# Patient Record
Sex: MALE | Race: AMERICAN INDIAN OR ALASKA NATIVE | NOT HISPANIC OR LATINO | Employment: FULL TIME | URBAN - METROPOLITAN AREA
[De-identification: names, ages, dates, MRNs, and addresses within clinical notes are randomized per-mention and may not be internally consistent; named-entity substitution may affect disease eponyms.]

---

## 2024-02-21 ENCOUNTER — APPOINTMENT (OUTPATIENT)
Dept: GENERAL RADIOLOGY | Facility: CLINIC | Age: 34
End: 2024-02-21
Attending: EMERGENCY MEDICINE
Payer: COMMERCIAL

## 2024-02-21 ENCOUNTER — HOSPITAL ENCOUNTER (OUTPATIENT)
Facility: CLINIC | Age: 34
Setting detail: OBSERVATION
Discharge: HOME | End: 2024-02-22
Attending: EMERGENCY MEDICINE | Admitting: HOSPITALIST
Payer: COMMERCIAL

## 2024-02-21 ENCOUNTER — APPOINTMENT (OUTPATIENT)
Dept: CT IMAGING | Facility: CLINIC | Age: 34
End: 2024-02-21
Attending: EMERGENCY MEDICINE
Payer: COMMERCIAL

## 2024-02-21 DIAGNOSIS — D68.59 PROTEIN C DEFICIENCY (H): ICD-10-CM

## 2024-02-21 DIAGNOSIS — K92.2 LOWER GI BLEED: Primary | ICD-10-CM

## 2024-02-21 DIAGNOSIS — K92.2 UPPER GI BLEED: ICD-10-CM

## 2024-02-21 DIAGNOSIS — K92.1 MELENA: ICD-10-CM

## 2024-02-21 LAB
ABO/RH(D): NORMAL
ALBUMIN SERPL BCG-MCNC: 4.7 G/DL (ref 3.5–5.2)
ALP SERPL-CCNC: 100 U/L (ref 40–150)
ALT SERPL W P-5'-P-CCNC: 30 U/L (ref 0–70)
ANION GAP SERPL CALCULATED.3IONS-SCNC: 10 MMOL/L (ref 7–15)
ANTIBODY SCREEN: NEGATIVE
AST SERPL W P-5'-P-CCNC: 22 U/L (ref 0–45)
BASOPHILS # BLD AUTO: 0.1 10E3/UL (ref 0–0.2)
BASOPHILS NFR BLD AUTO: 1 %
BILIRUB SERPL-MCNC: 0.3 MG/DL
BUN SERPL-MCNC: 12.9 MG/DL (ref 6–20)
CALCIUM SERPL-MCNC: 9.1 MG/DL (ref 8.6–10)
CHLORIDE SERPL-SCNC: 107 MMOL/L (ref 98–107)
CREAT SERPL-MCNC: 0.81 MG/DL (ref 0.67–1.17)
DEPRECATED HCO3 PLAS-SCNC: 25 MMOL/L (ref 22–29)
EGFRCR SERPLBLD CKD-EPI 2021: >90 ML/MIN/1.73M2
EOSINOPHIL # BLD AUTO: 0.2 10E3/UL (ref 0–0.7)
EOSINOPHIL NFR BLD AUTO: 2 %
ERYTHROCYTE [DISTWIDTH] IN BLOOD BY AUTOMATED COUNT: 12.4 % (ref 10–15)
ERYTHROCYTE [DISTWIDTH] IN BLOOD BY AUTOMATED COUNT: 12.5 % (ref 10–15)
GLUCOSE SERPL-MCNC: 97 MG/DL (ref 70–99)
HCT VFR BLD AUTO: 42.4 % (ref 40–53)
HCT VFR BLD AUTO: 42.8 % (ref 40–53)
HGB BLD-MCNC: 14 G/DL (ref 13.3–17.7)
HGB BLD-MCNC: 14.3 G/DL (ref 13.3–17.7)
HGB BLD-MCNC: 14.3 G/DL (ref 13.3–17.7)
HGB BLD-MCNC: 14.4 G/DL (ref 13.3–17.7)
HOLD SPECIMEN: NORMAL
HOLD SPECIMEN: NORMAL
IMM GRANULOCYTES # BLD: 0.1 10E3/UL
IMM GRANULOCYTES NFR BLD: 1 %
INR PPP: 1.39 (ref 0.85–1.15)
LYMPHOCYTES # BLD AUTO: 2.6 10E3/UL (ref 0.8–5.3)
LYMPHOCYTES NFR BLD AUTO: 31 %
MCH RBC QN AUTO: 31.7 PG (ref 26.5–33)
MCH RBC QN AUTO: 32.4 PG (ref 26.5–33)
MCHC RBC AUTO-ENTMCNC: 33.6 G/DL (ref 31.5–36.5)
MCHC RBC AUTO-ENTMCNC: 33.8 G/DL (ref 31.5–36.5)
MCV RBC AUTO: 94 FL (ref 78–100)
MCV RBC AUTO: 95 FL (ref 78–100)
MONOCYTES # BLD AUTO: 0.6 10E3/UL (ref 0–1.3)
MONOCYTES NFR BLD AUTO: 7 %
NEUTROPHILS # BLD AUTO: 5.1 10E3/UL (ref 1.6–8.3)
NEUTROPHILS NFR BLD AUTO: 58 %
NRBC # BLD AUTO: 0 10E3/UL
NRBC BLD AUTO-RTO: 0 /100
PLATELET # BLD AUTO: 252 10E3/UL (ref 150–450)
PLATELET # BLD AUTO: 265 10E3/UL (ref 150–450)
POTASSIUM SERPL-SCNC: 4.5 MMOL/L (ref 3.4–5.3)
PROT SERPL-MCNC: 7.9 G/DL (ref 6.4–8.3)
RBC # BLD AUTO: 4.47 10E6/UL (ref 4.4–5.9)
RBC # BLD AUTO: 4.54 10E6/UL (ref 4.4–5.9)
SODIUM SERPL-SCNC: 142 MMOL/L (ref 135–145)
SPECIMEN EXPIRATION DATE: NORMAL
WBC # BLD AUTO: 7.8 10E3/UL (ref 4–11)
WBC # BLD AUTO: 8.5 10E3/UL (ref 4–11)

## 2024-02-21 PROCEDURE — 250N000011 HC RX IP 250 OP 636: Performed by: EMERGENCY MEDICINE

## 2024-02-21 PROCEDURE — 85610 PROTHROMBIN TIME: CPT | Performed by: EMERGENCY MEDICINE

## 2024-02-21 PROCEDURE — G0378 HOSPITAL OBSERVATION PER HR: HCPCS

## 2024-02-21 PROCEDURE — 85018 HEMOGLOBIN: CPT | Performed by: PHYSICIAN ASSISTANT

## 2024-02-21 PROCEDURE — 85027 COMPLETE CBC AUTOMATED: CPT | Performed by: PHYSICIAN ASSISTANT

## 2024-02-21 PROCEDURE — 96365 THER/PROPH/DIAG IV INF INIT: CPT

## 2024-02-21 PROCEDURE — 86900 BLOOD TYPING SEROLOGIC ABO: CPT | Performed by: EMERGENCY MEDICINE

## 2024-02-21 PROCEDURE — 99223 1ST HOSP IP/OBS HIGH 75: CPT | Mod: AI | Performed by: PHYSICIAN ASSISTANT

## 2024-02-21 PROCEDURE — 36415 COLL VENOUS BLD VENIPUNCTURE: CPT | Performed by: PHYSICIAN ASSISTANT

## 2024-02-21 PROCEDURE — 96376 TX/PRO/DX INJ SAME DRUG ADON: CPT

## 2024-02-21 PROCEDURE — 36415 COLL VENOUS BLD VENIPUNCTURE: CPT | Performed by: EMERGENCY MEDICINE

## 2024-02-21 PROCEDURE — 258N000003 HC RX IP 258 OP 636: Performed by: PHYSICIAN ASSISTANT

## 2024-02-21 PROCEDURE — 85018 HEMOGLOBIN: CPT | Performed by: EMERGENCY MEDICINE

## 2024-02-21 PROCEDURE — 80053 COMPREHEN METABOLIC PANEL: CPT | Performed by: EMERGENCY MEDICINE

## 2024-02-21 PROCEDURE — 99285 EMERGENCY DEPT VISIT HI MDM: CPT

## 2024-02-21 PROCEDURE — 71046 X-RAY EXAM CHEST 2 VIEWS: CPT

## 2024-02-21 PROCEDURE — 74177 CT ABD & PELVIS W/CONTRAST: CPT

## 2024-02-21 PROCEDURE — 85025 COMPLETE CBC W/AUTO DIFF WBC: CPT | Performed by: EMERGENCY MEDICINE

## 2024-02-21 PROCEDURE — 250N000009 HC RX 250: Performed by: EMERGENCY MEDICINE

## 2024-02-21 PROCEDURE — 250N000011 HC RX IP 250 OP 636: Performed by: PHYSICIAN ASSISTANT

## 2024-02-21 PROCEDURE — 96366 THER/PROPH/DIAG IV INF ADDON: CPT

## 2024-02-21 RX ORDER — ACETAMINOPHEN 650 MG/1
650 SUPPOSITORY RECTAL EVERY 4 HOURS PRN
Status: DISCONTINUED | OUTPATIENT
Start: 2024-02-21 | End: 2024-02-22 | Stop reason: HOSPADM

## 2024-02-21 RX ORDER — POLYETHYLENE GLYCOL 3350 17 G/17G
17 POWDER, FOR SOLUTION ORAL 2 TIMES DAILY PRN
Status: DISCONTINUED | OUTPATIENT
Start: 2024-02-21 | End: 2024-02-22 | Stop reason: HOSPADM

## 2024-02-21 RX ORDER — ACETAMINOPHEN 325 MG/1
650 TABLET ORAL EVERY 4 HOURS PRN
Status: DISCONTINUED | OUTPATIENT
Start: 2024-02-21 | End: 2024-02-22 | Stop reason: HOSPADM

## 2024-02-21 RX ORDER — ONDANSETRON 4 MG/1
4 TABLET, ORALLY DISINTEGRATING ORAL EVERY 6 HOURS PRN
Status: DISCONTINUED | OUTPATIENT
Start: 2024-02-21 | End: 2024-02-22 | Stop reason: HOSPADM

## 2024-02-21 RX ORDER — CYCLOBENZAPRINE HCL 10 MG
TABLET ORAL
COMMUNITY
Start: 2024-01-09

## 2024-02-21 RX ORDER — LIDOCAINE 40 MG/G
CREAM TOPICAL
Status: DISCONTINUED | OUTPATIENT
Start: 2024-02-21 | End: 2024-02-22 | Stop reason: HOSPADM

## 2024-02-21 RX ORDER — AMOXICILLIN 250 MG
2 CAPSULE ORAL 2 TIMES DAILY PRN
Status: DISCONTINUED | OUTPATIENT
Start: 2024-02-21 | End: 2024-02-22 | Stop reason: HOSPADM

## 2024-02-21 RX ORDER — HEPARIN SODIUM 10000 [USP'U]/100ML
0-5000 INJECTION, SOLUTION INTRAVENOUS CONTINUOUS
Status: DISCONTINUED | OUTPATIENT
Start: 2024-02-21 | End: 2024-02-22

## 2024-02-21 RX ORDER — AMOXICILLIN 250 MG
1 CAPSULE ORAL 2 TIMES DAILY PRN
Status: DISCONTINUED | OUTPATIENT
Start: 2024-02-21 | End: 2024-02-22 | Stop reason: HOSPADM

## 2024-02-21 RX ORDER — ONDANSETRON 2 MG/ML
4 INJECTION INTRAMUSCULAR; INTRAVENOUS EVERY 6 HOURS PRN
Status: DISCONTINUED | OUTPATIENT
Start: 2024-02-21 | End: 2024-02-22 | Stop reason: HOSPADM

## 2024-02-21 RX ORDER — SODIUM CHLORIDE 9 MG/ML
INJECTION, SOLUTION INTRAVENOUS CONTINUOUS
Status: ACTIVE | OUTPATIENT
Start: 2024-02-21 | End: 2024-02-22

## 2024-02-21 RX ORDER — RIVAROXABAN 20 MG/1
TABLET, FILM COATED ORAL
COMMUNITY
Start: 2024-01-09

## 2024-02-21 RX ORDER — IOPAMIDOL 755 MG/ML
500 INJECTION, SOLUTION INTRAVASCULAR ONCE
Status: COMPLETED | OUTPATIENT
Start: 2024-02-21 | End: 2024-02-21

## 2024-02-21 RX ADMIN — IOPAMIDOL 100 ML: 755 INJECTION, SOLUTION INTRAVENOUS at 13:08

## 2024-02-21 RX ADMIN — SODIUM CHLORIDE 100 ML: 9 INJECTION, SOLUTION INTRAVENOUS at 13:08

## 2024-02-21 RX ADMIN — HEPARIN SODIUM 1800 UNITS/HR: 10000 INJECTION, SOLUTION INTRAVENOUS at 19:44

## 2024-02-21 RX ADMIN — SODIUM CHLORIDE: 9 INJECTION, SOLUTION INTRAVENOUS at 19:12

## 2024-02-21 ASSESSMENT — ACTIVITIES OF DAILY LIVING (ADL)
ADLS_ACUITY_SCORE: 35
ADLS_ACUITY_SCORE: 35
ADLS_ACUITY_SCORE: 18
ADLS_ACUITY_SCORE: 35
ADLS_ACUITY_SCORE: 18
ADLS_ACUITY_SCORE: 35

## 2024-02-21 ASSESSMENT — COLUMBIA-SUICIDE SEVERITY RATING SCALE - C-SSRS
1. IN THE PAST MONTH, HAVE YOU WISHED YOU WERE DEAD OR WISHED YOU COULD GO TO SLEEP AND NOT WAKE UP?: NO
6. HAVE YOU EVER DONE ANYTHING, STARTED TO DO ANYTHING, OR PREPARED TO DO ANYTHING TO END YOUR LIFE?: NO
2. HAVE YOU ACTUALLY HAD ANY THOUGHTS OF KILLING YOURSELF IN THE PAST MONTH?: NO

## 2024-02-21 NOTE — ED TRIAGE NOTES
"Pt c/o bright red blood in stool that started this AM, reports 3 episodes associated with abdominal pain, denies N/V. 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 Reports fatigue the last couple days. A&OX4.      Triage Assessment (Adult)       Row Name 02/21/24 1036          Triage Assessment    Airway WDL WDL        Respiratory WDL    Respiratory WDL WDL        Skin Circulation/Temperature WDL    Skin Circulation/Temperature WDL WDL        Cardiac WDL    Cardiac WDL WDL        Peripheral/Neurovascular WDL    Peripheral Neurovascular WDL WDL        Cognitive/Neuro/Behavioral WDL    Cognitive/Neuro/Behavioral WDL WDL                     "

## 2024-02-21 NOTE — ED PROVIDER NOTES
"    History     Chief Complaint:  Rectal Bleeding       HPI   Rk Benoit is a 33 year old male who presents with rectal bleeding. The patient has had three bowel movements with bright red blood. He has had some chest pain since 1000. He denies shortness of breath. He has some abdominal cramping, and has felt fatigued over the past couple days. He denies alcohol use.     Independent Historian:    Patient    Review of External Notes:        Medications:    Xarelto     Past Medical History:    Protein C deficiency    Physical Exam   Patient Vitals for the past 24 hrs:   BP Temp Temp src Pulse Resp SpO2 Height Weight   02/21/24 1530 (!) 143/91 -- -- 92 12 99 % -- --   02/21/24 1248 127/81 -- -- 75 16 100 % -- --   02/21/24 1034 (!) 144/98 98.5  F (36.9  C) Oral 66 18 99 % -- --   02/21/24 1031 -- -- -- -- -- -- 1.854 m (6' 1\") 145 kg (319 lb 10.7 oz)        Physical Exam  General: Well-nourished, resting comfortably when I enter the room  Eyes: Pupils equal, conjunctivae pink no scleral icterus or conjunctival injection  ENT:  Moist mucus membranes  Respiratory:  Lungs clear to auscultation bilaterally, no crackles/rubs/wheezes.  Good air movement  CV: Normal rate and rhythm, no murmurs  GI:  Abdomen soft and non-distended.  No guarding or rebound.  Mild tenderness to palpation of the diffuse abdomen.  Skin: Warm, dry.  No rashes or petechiae  Musculoskeletal: No peripheral edema or calf tenderness  Neuro: Alert and oriented to person/place/time  Psychiatric: Normal affect      Emergency Department Course         Imaging:  Chest XR,  PA & LAT   Final Result   IMPRESSION: Negative chest.      MIKO RILEY MD            SYSTEM ID:  [id number]      CT Abdomen Pelvis w Contrast   Final Result   IMPRESSION:    1.  No acute abnormality identified.   2.  Fatty liver.      RIGO MARTINES MD            SYSTEM ID:  [id number]        Report per radiology    Laboratory:  Labs Ordered and Resulted from Time of ED Arrival to Time " of ED Departure   INR - Abnormal       Result Value    INR 1.39 (*)    COMPREHENSIVE METABOLIC PANEL - Normal    Sodium 142      Potassium 4.5      Carbon Dioxide (CO2) 25      Anion Gap 10      Urea Nitrogen 12.9      Creatinine 0.81      GFR Estimate >90      Calcium 9.1      Chloride 107      Glucose 97      Alkaline Phosphatase 100      AST 22      ALT 30      Protein Total 7.9      Albumin 4.7      Bilirubin Total 0.3     HEMOGLOBIN - Normal    Hemoglobin 14.3     CBC WITH PLATELETS AND DIFFERENTIAL    WBC Count 8.5      RBC Count 4.54      Hemoglobin 14.4      Hematocrit 42.8      MCV 94      MCH 31.7      MCHC 33.6      RDW 12.4      Platelet Count 252      % Neutrophils 58      % Lymphocytes 31      % Monocytes 7      % Eosinophils 2      % Basophils 1      % Immature Granulocytes 1      NRBCs per 100 WBC 0      Absolute Neutrophils 5.1      Absolute Lymphocytes 2.6      Absolute Monocytes 0.6      Absolute Eosinophils 0.2      Absolute Basophils 0.1      Absolute Immature Granulocytes 0.1      Absolute NRBCs 0.0     TYPE AND SCREEN, ADULT    ABO/RH(D) A POS      Antibody Screen Negative      SPECIMEN EXPIRATION DATE [id number]     ABO/RH TYPE AND SCREEN        Procedures       Emergency Department Course & Assessments:    Interventions:  Medications   sodium chloride 0.9 % bag 100 mL for CT scan flush use (100 mLs As instructed $Given 2/21/24 1308)   iopamidol (ISOVUE-370) solution 500 mL (100 mLs Intravenous $Given 2/21/24 1308)        Assessments:  1225 I obtained history and examined patient.       Independent Interpretation (X-rays, CTs, rhythm strip):  None    Consultations/Discussion of Management or Tests:  Spoke with Lucia with hospitalist team, accepts patient for admission for observation to Dr. Ribeiro.       Social Determinants of Health affecting care:  None     Disposition:  The patient was admitted to the hospital under the care of Dr. Ribeiro.     Impression & Plan    CMS Diagnoses:  None      Medical Decision Makin-year-old male with a history of protein C deficiency on Xarelto presents to the emergency department with a complaint of bright red bowel movements.  Patient reports this started this morning.  He has had 3 bowel movements that are dark and bright red.  He has not had any nausea or vomiting.  He does report abdominal cramping diffusely.  He states that he has had some intermittent chest pain since 10 AM this morning.  Denies any shortness of breath.  On exam patient's abdomen is soft, he has some mild tenderness to palpation.  Patient does provide a stool sample that is dark in color and looks very bloody.  Patient is not having any bleeding between bowel movements.  Vital signs remained stable, the patient is not hypotensive or tachycardic.  Repeat hemoglobin is stable.  CT scan does not show any acute findings.  His creatinine and BUN are not elevated, but his stool looks almost black, and I have suspicion for an upper GI bleed versus a lower GI bleed.  He has never had a colonoscopy before.  He reports that he is from out of town and staying in a hotel by himself.  As the patient has had 2 more bloody bowel movements here in the ED since his second recheck of hemoglobin I think it would be beneficial for him to stay here in for observation.  Patient is admitted for observation to Dr. Ribeiro.      Diagnosis:    ICD-10-CM    1. Upper GI bleed  K92.2       2. Protein C deficiency (H24)  D68.59       3. Melena  K92.1            Discharge Medications:  New Prescriptions    No medications on file          Scribe Disclosure:  Cristhian FORBES, am serving as a scribe at 12:25 PM on 2024 to document services personally performed by Evelia Garcia MD based on my observations and the provider's statements to me.  2024   Evelia Garcia MD Richardson, Elizabeth, MD  24

## 2024-02-21 NOTE — PHARMACY-ADMISSION MEDICATION HISTORY
Pharmacist Admission Medication History    Admission medication history is complete. The information provided in this note is only as accurate as the sources available at the time of the update.    Information Source(s): Patient and CareEverywhere/SureScripts via in-person    Pertinent Information: Patient reports an allergy to an antibiotic but does not know which one. He reports that when he took it he experienced hot flashes, chills, nausea, and vomiting. There is no outside chart access to review, however since these are not true allergy symptoms, this has not been added to the chart.     Changes made to PTA medication list:  Added: None  Deleted: None  Changed: None    Allergies reviewed with patient and updates made in EHR: yes    Medication History Completed By:   Amira Teague PharmD, [facility]  Emergency Medicine Clinical Pharmacist  [phone]   2/21/2024 5:43 PM    PTA Med List   Medication Sig Last Dose    cyclobenzaprine (FLEXERIL) 10 MG tablet TAKE ONE TABLET BY MOUTH EVERY EVENING AT BEDTIME FOR BACK PAIN Past Month at -    XARELTO ANTICOAGULANT 20 MG TABS tablet TAKE ONE TABLET BY MOUTH ONCE DAILY WITH THE EVENING MEAL 2/20/2024 at PM

## 2024-02-22 VITALS
BODY MASS INDEX: 41.27 KG/M2 | DIASTOLIC BLOOD PRESSURE: 81 MMHG | WEIGHT: 311.4 LBS | HEART RATE: 81 BPM | OXYGEN SATURATION: 98 % | HEIGHT: 73 IN | RESPIRATION RATE: 16 BRPM | TEMPERATURE: 98.2 F | SYSTOLIC BLOOD PRESSURE: 131 MMHG

## 2024-02-22 LAB
ANION GAP SERPL CALCULATED.3IONS-SCNC: 9 MMOL/L (ref 7–15)
APTT PPP: 65 SECONDS (ref 22–38)
APTT PPP: 73 SECONDS (ref 22–38)
APTT PPP: 74 SECONDS (ref 22–38)
BUN SERPL-MCNC: 11.4 MG/DL (ref 6–20)
CALCIUM SERPL-MCNC: 8.2 MG/DL (ref 8.6–10)
CHLORIDE SERPL-SCNC: 107 MMOL/L (ref 98–107)
CREAT SERPL-MCNC: 0.74 MG/DL (ref 0.67–1.17)
DEPRECATED HCO3 PLAS-SCNC: 25 MMOL/L (ref 22–29)
EGFRCR SERPLBLD CKD-EPI 2021: >90 ML/MIN/1.73M2
ERYTHROCYTE [DISTWIDTH] IN BLOOD BY AUTOMATED COUNT: 12.3 % (ref 10–15)
GLUCOSE SERPL-MCNC: 94 MG/DL (ref 70–99)
HCT VFR BLD AUTO: 41.9 % (ref 40–53)
HGB BLD-MCNC: 14 G/DL (ref 13.3–17.7)
HGB BLD-MCNC: 14.2 G/DL (ref 13.3–17.7)
MCH RBC QN AUTO: 31.8 PG (ref 26.5–33)
MCHC RBC AUTO-ENTMCNC: 33.9 G/DL (ref 31.5–36.5)
MCV RBC AUTO: 94 FL (ref 78–100)
PLATELET # BLD AUTO: 234 10E3/UL (ref 150–450)
POTASSIUM SERPL-SCNC: 3.8 MMOL/L (ref 3.4–5.3)
RBC # BLD AUTO: 4.46 10E6/UL (ref 4.4–5.9)
SODIUM SERPL-SCNC: 141 MMOL/L (ref 135–145)
UFH PPP CHRO-ACNC: 0.78 IU/ML
WBC # BLD AUTO: 8 10E3/UL (ref 4–11)

## 2024-02-22 PROCEDURE — 96366 THER/PROPH/DIAG IV INF ADDON: CPT

## 2024-02-22 PROCEDURE — 85730 THROMBOPLASTIN TIME PARTIAL: CPT | Performed by: PHYSICIAN ASSISTANT

## 2024-02-22 PROCEDURE — 36415 COLL VENOUS BLD VENIPUNCTURE: CPT | Performed by: PHYSICIAN ASSISTANT

## 2024-02-22 PROCEDURE — 85520 HEPARIN ASSAY: CPT | Performed by: PHYSICIAN ASSISTANT

## 2024-02-22 PROCEDURE — 85018 HEMOGLOBIN: CPT | Performed by: PHYSICIAN ASSISTANT

## 2024-02-22 PROCEDURE — 250N000013 HC RX MED GY IP 250 OP 250 PS 637: Performed by: PHYSICIAN ASSISTANT

## 2024-02-22 PROCEDURE — G0378 HOSPITAL OBSERVATION PER HR: HCPCS

## 2024-02-22 PROCEDURE — 80048 BASIC METABOLIC PNL TOTAL CA: CPT | Performed by: PHYSICIAN ASSISTANT

## 2024-02-22 PROCEDURE — 85730 THROMBOPLASTIN TIME PARTIAL: CPT | Mod: 91 | Performed by: PHYSICIAN ASSISTANT

## 2024-02-22 PROCEDURE — 99239 HOSP IP/OBS DSCHRG MGMT >30: CPT | Performed by: HOSPITALIST

## 2024-02-22 RX ADMIN — ACETAMINOPHEN 650 MG: 325 TABLET, FILM COATED ORAL at 08:17

## 2024-02-22 ASSESSMENT — ACTIVITIES OF DAILY LIVING (ADL)
ADLS_ACUITY_SCORE: 18

## 2024-02-22 NOTE — DISCHARGE SUMMARY
"[facility]  Hospitalist Discharge Summary      Date of Admission:  2/21/2024  Date of Discharge:  2/22/2024  Discharging Provider: Ede Ribeiro MD  Discharge Service: Hospitalist Service    Discharge Diagnoses   Bright red blood per rectum  Likely hemorrhoidal bleed    Clinically Significant Risk Factors     # Severe Obesity: Estimated body mass index is 41.08 kg/m  as calculated from the following:    Height as of this encounter: 1.854 m (6' 1\").    Weight as of this encounter: 141.3 kg (311 lb 6.4 oz).       Follow-ups Needed After Discharge   Follow-up Appointments     Follow-up and recommended labs and tests       Follow up with primary care provider, Physician No Ref-Primary, within 7   days for hospital follow- up.  The following labs/tests are recommended:   hemoglobin check.  Follow-up with colorectal surgery. You likely need a colonoscopy and then   discuss possible banding of any hemorrhoids.          Unresulted Labs Ordered in the Past 30 Days of this Admission       No orders found for last 31 day(s).        These results will be followed up by NA    Discharge Disposition   Discharged to home  Condition at discharge: Stable    Hospital Course   Rk Benoit is a 33 year old male with PMHx significant for protein C deficiency on Xarelto with hx of multiple DVT/PEs and notes chronic PE and DVT, who presents to the ED with BRBPR. He notes having a BM first thing this morning with blood when he wiped. He then went to work and had 2 episodes of just blood with associated preceding abdominal cramping. ED provider reports that he had another stool in ED that was a little darker. Pt notes melena several years ago and this was different. He notes feeling mildly lightheaded and unwell but otherwise denies fever, chest pain, SOB, nausea or vomiting. Denies changes to stools or excessive straining.   He is on Xarelto for Protein C deficiency. He notes a hx of multiple PE/DVTs with " "chronic clot in his lungs and his legs. He did not take Xarelto this morning and notes that his R leg already feels warm.    Patient was admitted yesterday and I assumed care today.  He has had no bleeding while here in the hospital.  His hemoglobin is stable.  He has known hemorrhoids.  He states that he has had some bleeding in the past.  He can feel a \"lump \"when he wipes.  He does not have issues with constipation.  He has never had a colonoscopy.  I indicated that because he is anticoagulated for his protein C deficiency, he might have issues with this again in the future.  I indicated neck step would be to have a colonoscopy.  I recommended that this be done with a colorectal surgeon.  This way, if he needs his hemorrhoids taken care of he is already seeing the right specialty.  Patient denies any new symptoms.  No chest pain or shortness of breath.  I am going to stop the heparin and resume his Xarelto.  Apparently he takes his Xarelto at different times of the day.  Sometimes in the morning and sometimes at night.  I indicated that it is a 24-hour drug and he should try to take it at about the same time each day.  I did call and update his wife.  Consultations This Hospital Stay   PHARMACY IP CONSULT    Code Status   Full Code    Time Spent on this Encounter   I, Ede Ribeiro MD, personally saw the patient today and spent greater than 30 minutes discharging this patient.       Ede Ribeiro MD  [facility] OBSERVATION DEPT  [address] [facility] [address]  Phone: [phone]  ______________________________________________________________________    Physical Exam   Vital Signs: Temp: 98.2  F (36.8  C) Temp src: Oral BP: 131/81 Pulse: 81   Resp: 16 SpO2: 98 % O2 Device: None (Room air)    Weight: 311 lbs 6.4 oz  Constitutional: awake, alert, cooperative, no apparent distress, and appears stated age  Eyes: Lids and lashes normal, pupils equal, round and reactive to light, " extra ocular muscles intact, sclera clear, conjunctiva normal  ENT: Normocephalic, without obvious abnormality, atraumatic, sinuses nontender on palpation, external ears without lesions, oral pharynx with moist mucous membranes, tonsils without erythema or exudates, gums normal and good dentition.       Primary Care Physician   Physician No Ref-Primary    Discharge Orders      Adult Colorectal Surgery  Referral      Reason for your hospital stay    Bright red blood per rectum  Likely a hemorrhoidal bleed     Follow-up and recommended labs and tests     Follow up with primary care provider, Physician No Ref-Primary, within 7 days for hospital follow- up.  The following labs/tests are recommended: hemoglobin check.  Follow-up with colorectal surgery. You likely need a colonoscopy and then discuss possible banding of any hemorrhoids.     Activity    Your activity upon discharge: activity as tolerated     Diet    Follow this diet upon discharge: Orders Placed This Encounter      Regular Diet Adult  Avoid constipation       Significant Results and Procedures   Most Recent 3 CBC's:  Recent Labs   Lab Test 02/22/24  0505 02/22/24  0101 02/21/24  1914 02/21/24  1554 02/21/24  1233   WBC 8.0  --   --  7.8 8.5   HGB 14.2 14.0 14.0 14.3  14.3 14.4   MCV 94  --   --  95 94     --   --  265 252     Most Recent 3 BMP's:  Recent Labs   Lab Test 02/22/24  0505 02/21/24  1056    142   POTASSIUM 3.8 4.5   CHLORIDE 107 107   CO2 25 25   BUN 11.4 12.9   CR 0.74 0.81   ANIONGAP 9 10   DESTINY 8.2* 9.1   GLC 94 97     Most Recent 2 LFT's:  Recent Labs   Lab Test 02/21/24  1056   AST 22   ALT 30   ALKPHOS 100   BILITOTAL 0.3   ,   Results for orders placed or performed during the hospital encounter of 02/21/24   Chest XR,  PA & LAT    Narrative    XR CHEST 2 VIEWS  2/21/2024 1:51 PM       INDICATION: chest pain  COMPARISON: None       Impression    IMPRESSION: Negative chest.    MIKO RILEY MD         SYSTEM ID:   [id number]   CT Abdomen Pelvis w Contrast    Narrative    CT ABDOMEN AND PELVIS WITH CONTRAST  2/21/2024 1:14 PM    CLINICAL HISTORY: Diffuse abdominal pain, rectal bleeding.    TECHNIQUE: CT scan of the abdomen and pelvis was performed following  injection of IV contrast. Multiplanar reformats were obtained. Dose  reduction techniques were used.  CONTRAST: 100 mL Isovue-370    COMPARISON: None.    FINDINGS:   LOWER CHEST: Normal.    HEPATOBILIARY: Fatty liver. Gallbladder appears unremarkable.    PANCREAS: Normal.    SPLEEN: Normal.    ADRENAL GLANDS: Normal.    KIDNEYS/BLADDER: Normal.    BOWEL: No bowel obstruction, or acute inflammatory change identified.  Please note that CT has limited sensitivity for detection of  underlying bowel masses. There is no perirectal or perianal abscess  identified at this time. Normal appendix.    PELVIC ORGANS: No acute abnormality.    ADDITIONAL FINDINGS: None.    MUSCULOSKELETAL: Normal.      Impression    IMPRESSION:   1.  No acute abnormality identified.  2.  Fatty liver.    RIGO MARTINES MD         SYSTEM ID:  [id number]       Discharge Medications   Current Discharge Medication List        CONTINUE these medications which have NOT CHANGED    Details   cyclobenzaprine (FLEXERIL) 10 MG tablet TAKE ONE TABLET BY MOUTH EVERY EVENING AT BEDTIME FOR BACK PAIN      XARELTO ANTICOAGULANT 20 MG TABS tablet TAKE ONE TABLET BY MOUTH ONCE DAILY WITH THE EVENING MEAL           Allergies   No Known Allergies

## 2024-02-22 NOTE — CARE PLAN
PRIMARY DIAGNOSIS: GI BLEED    OUTPATIENT/OBSERVATION GOALS TO BE MET BEFORE DISCHARGE  Orthostatic performed: N/A    Stable Hgb  yes .   Recent Labs   Lab Test 02/21/24  1914 02/21/24  1554 02/21/24  1233   HGB 14.0 14.3  14.3 14.4       Resolved or declined bleeding episodes: Yes Last episode: not yet in the shift    Appropriate testing complete: No    Cleared for discharge by consultants (if involved): No    Safe discharge environment identified: N/A    Discharge Planner Nurse   Safe discharge environment identified: No  Barriers to discharge: Yes       Entered by: Olvin Zapata RN 02/22/2024 12:44 AM  Vitals are Temp: 98  F (36.7  C) Temp src: Oral BP: (!) 143/91 Pulse: 87   Resp: 18 SpO2: 98 %.  Patient is Alert and Oriented x4. independent with no assistive devices .Pt is on a NPO diet. Patient denies of pain. Heparin running at 1800 unit/ hr. Pt is doing great in the room.  Please review provider order for any additional goals.   Nurse to notify provider when observation goals have been met and patient is ready for discharge.

## 2024-02-22 NOTE — ED NOTES
"[facility]  ED Nurse Handoff Report    ED Chief complaint: Rectal Bleeding  . ED Diagnosis:   Final diagnoses:   Upper GI bleed   Protein C deficiency (H24)   Melena       Allergies: No Known Allergies    Code Status: Full Code    Activity level - Baseline/Home:  independent.  Activity Level - Current:   independent.   Lift room needed: No.   Bariatric: No   Needed: No   Isolation: No.   Infection: Not Applicable.     Respiratory status: Room air    Vital Signs (within 30 minutes):   Vitals:    02/21/24 1034 02/21/24 1248 02/21/24 1530 02/21/24 1730   BP: (!) 144/98 127/81 (!) 143/91 128/67   Pulse: 66 75 92 65   Resp: 18 16 12 11   Temp: 98.5  F (36.9  C)      TempSrc: Oral      SpO2: 99% 100% 99% 100%   Weight:       Height:           Cardiac Rhythm:  ,      Pain level:    Patient confused: No.   Patient Falls Risk: nonskid shoes/slippers when out of bed.   Elimination Status: Has voided     Patient Report - Initial Complaint: Pt c/o bright red blood in stool that started this AM, reports 3 episodes associated with abdominal pain, denies N/V. 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 Reports fatigue the last couple days. A&OX4.  .   Focused Assessment: GI work up     Abnormal Results:   Labs Ordered and Resulted from Time of ED Arrival to Time of ED Departure   INR - Abnormal       Result Value    INR 1.39 (*)    COMPREHENSIVE METABOLIC PANEL - Normal    Sodium 142      Potassium 4.5      Carbon Dioxide (CO2) 25      Anion Gap 10      Urea Nitrogen 12.9      Creatinine 0.81      GFR Estimate >90      Calcium 9.1      Chloride 107      Glucose 97      Alkaline Phosphatase 100      AST 22      ALT 30      Protein Total 7.9      Albumin 4.7      Bilirubin Total 0.3     HEMOGLOBIN - Normal    Hemoglobin 14.3     CBC WITH PLATELETS AND DIFFERENTIAL    WBC Count 8.5      RBC Count 4.54      Hemoglobin 14.4      Hematocrit 42.8      " MCV 94      MCH 31.7      MCHC 33.6      RDW 12.4      Platelet Count 252      % Neutrophils 58      % Lymphocytes 31      % Monocytes 7      % Eosinophils 2      % Basophils 1      % Immature Granulocytes 1      NRBCs per 100 WBC 0      Absolute Neutrophils 5.1      Absolute Lymphocytes 2.6      Absolute Monocytes 0.6      Absolute Eosinophils 0.2      Absolute Basophils 0.1      Absolute Immature Granulocytes 0.1      Absolute NRBCs 0.0     TYPE AND SCREEN, ADULT    ABO/RH(D) A POS      Antibody Screen Negative      SPECIMEN EXPIRATION DATE [id number]     ABO/RH TYPE AND SCREEN        Chest XR,  PA & LAT   Final Result   IMPRESSION: Negative chest.      MIKO RILEY MD            SYSTEM ID:  [id number]      CT Abdomen Pelvis w Contrast   Final Result   IMPRESSION:    1.  No acute abnormality identified.   2.  Fatty liver.      RIGO MARTINES MD            SYSTEM ID:  [id number]          Treatments provided:   Labs Ordered and Resulted from Time of ED Arrival to Time of ED Departure   INR - Abnormal       Result Value    INR 1.39 (*)    COMPREHENSIVE METABOLIC PANEL - Normal    Sodium 142      Potassium 4.5      Carbon Dioxide (CO2) 25      Anion Gap 10      Urea Nitrogen 12.9      Creatinine 0.81      GFR Estimate >90      Calcium 9.1      Chloride 107      Glucose 97      Alkaline Phosphatase 100      AST 22      ALT 30      Protein Total 7.9      Albumin 4.7      Bilirubin Total 0.3     HEMOGLOBIN - Normal    Hemoglobin 14.3     CBC WITH PLATELETS AND DIFFERENTIAL    WBC Count 8.5      RBC Count 4.54      Hemoglobin 14.4      Hematocrit 42.8      MCV 94      MCH 31.7      MCHC 33.6      RDW 12.4      Platelet Count 252      % Neutrophils 58      % Lymphocytes 31      % Monocytes 7      % Eosinophils 2      % Basophils 1      % Immature Granulocytes 1      NRBCs per 100 WBC 0      Absolute Neutrophils 5.1      Absolute Lymphocytes 2.6      Absolute Monocytes 0.6      Absolute Eosinophils 0.2      Absolute  Basophils 0.1      Absolute Immature Granulocytes 0.1      Absolute NRBCs 0.0     TYPE AND SCREEN, ADULT    ABO/RH(D) A POS      Antibody Screen Negative      SPECIMEN EXPIRATION DATE [id number]     ABO/RH TYPE AND SCREEN     Chest XR,  PA & LAT   Final Result   IMPRESSION: Negative chest.      MIKO RILEY MD            SYSTEM ID:  [id number]      CT Abdomen Pelvis w Contrast   Final Result   IMPRESSION:    1.  No acute abnormality identified.   2.  Fatty liver.      RIGO MARTINES MD            SYSTEM ID:  [id number]          Family Comments: none  OBS brochure/video discussed/provided to patient:  Yes  ED Medications:   Medications   sodium chloride 0.9 % bag 100 mL for CT scan flush use (100 mLs As instructed $Given 2/21/24 1308)   iopamidol (ISOVUE-370) solution 500 mL (100 mLs Intravenous $Given 2/21/24 1308)       Drips infusing:  No  For the majority of the shift this patient was Green.   Interventions performed were none.    Sepsis treatment initiated: No    Cares/treatment/interventions/medications to be completed following ED care: care plan    ED Nurse Name: Brooke Gordillo RN  6:31 PM  RECEIVING UNIT ED HANDOFF REVIEW    Above ED Nurse Handoff Report was reviewed: Yes  Reviewed by: Olvin Zapata RN on February 21, 2024 at 8:24 PM   ANDREI Gutierrez called the ED to inform them the note was read: Yes

## 2024-02-22 NOTE — PLAN OF CARE
[facility]    ED Boarding Nurse Handoff Addendum Report:    Date/time: 2/21/2024, 8:30 PM    Activity Level: independent    Fall Risk: N/A    Active Infusions: PIX x2,  ml/hr, Hep gtt 1800 units/hr     Current Meds Due: See Mar     Current care needs: See Orders     Oxygen requirements (liters/min and/or FiO2): none    Respiratory status: Room air    Vital signs (within last 30 minutes):    Vitals:    02/21/24 1830 02/21/24 1909 02/21/24 1915 02/21/24 1930   BP: 133/77 122/70 122/70 129/72   Pulse: 102  67 69   Resp: 25  12 15   Temp:  97.4  F (36.3  C)     TempSrc:  Oral     SpO2: 97%  100% 100%   Weight:       Height:           Focused assessment within last 30 minutes:      ED Boarding Nurse name: Barb Oglesby RN

## 2024-02-22 NOTE — PLAN OF CARE
Patient's After Visit Summary was reviewed with patient   Patient verbalized understanding of After Visit Summary, recommended follow up and was given an opportunity to ask questions. Yes  Discharge medications sent home with patient/family: Not applicable   Discharged: Drove himself home  Goal Outcome Evaluation:

## 2024-02-22 NOTE — CARE PLAN
PRIMARY DIAGNOSIS: GI BLEED    OUTPATIENT/OBSERVATION GOALS TO BE MET BEFORE DISCHARGE  Orthostatic performed: N/A    Stable Hgb  yes .   Recent Labs   Lab Test 02/21/24  1914 02/21/24  1554 02/21/24  1233   HGB 14.0 14.3  14.3 14.4       Resolved or declined bleeding episodes: Yes Last episode: not yet in the shift    Appropriate testing complete: No    Cleared for discharge by consultants (if involved): No    Safe discharge environment identified: N/A    Discharge Planner Nurse   Safe discharge environment identified: No  Barriers to discharge: Yes       Entered by: Olvin Zapata RN 02/22/2024 4:31 AM  Vitals are Temp: 98.3  F (36.8  C) Temp src: Oral BP: 114/67 Pulse: 58   Resp: 16 SpO2: 96 %.  Patient is Alert and Oriented x4. independent with no assistive devices .Pt is on a NPO diet. Patient denies of pain. PIX  x 2,  ml/hr. Hep gtt 1800 unit/hr.Lab drawn @ 2 am, hep dose unchanged.    Plan: Recheck heparin @ 8;30 pm  Continue care plan  Please review provider order for any additional goals.   Nurse to notify provider when observation goals have been met and patient is ready for discharge.

## 2024-02-22 NOTE — H&P
[facility]    History and Physical - Hospitalist Service       Date of Admission:  2/21/2024    Assessment & Plan      Rk Benoit is a 33 year old male with PMHx significant for protein C deficiency on Xarelto with hx of multiple DVT/PEs and notes chronic PE and DVT, who was admitted on 2/21/2024 with lower GI bleed.     1. Lower GI bleed  Onset of BRBPR this morning. Notes BM first thing this morning with blood when he wiped, then 2 episodes of just blood while at work with associated preceding abdominal cramping. Per ED provider, stool in ED was a little darker. Pt notes melena several years ago and this was different. He notes feeling mildly lightheaded and unwell but otherwise denies fever, chest pain, SOB, nausea or vomiting.   He is on Xarelto for Protein C deficiency. Notes hx of multiple PE/DVTs with chronic clot as well. Did not take Xarelto this morning.   VSS. CMP unremarkable. CBC unremarkable, Hgb 14.4-->14.3 after 3.5 hrs.CT abd/pelvis unremarkable. CXR clear.   Pt notes concern for worsening clot in LE even after missing just one dose. Report R calf is already starting to feel warm.   - admit to OBS  - suspect diverticular bleed vs internal hemorrhoids. Has not had a colonoscopy  - hold Xarelto  - given Protein C deficiency and chronic clot and report of calf feeling warm and given Hgb is stable,   - will start heparin gtt for now  - recheck Hgb q 4 hrs x2. Recheck CBC in AM  - NPO for bowel rest to help bleeding stop  - if hgb drops or ongoing profuse bleeding, stop heparin gtt until morning rounder can reassess  - if bleeding persists, consider GI consult. May need procedure if unable to come off anticoagulation, even for a short time    2. Protein C deficiency  Chronic anticoagulation  Hx of multiple DVT/PEs, some chronic clot burden per pt  Pt concerned about holding anticoagulation, given chronic clot burden, concerned with recurrent DVT/PE with holding anticoagulation  "for any amount of time. Already notes warmth in his R LE  - hold Xarelto   - start heparin gtt         Diet:  NPO  DVT Prophylaxis: Heparin gtt   Adame Catheter: Not present  Lines: None     Cardiac Monitoring: None  Code Status:  full code    Clinically Significant Risk Factors Present on Admission               # Drug Induced Coagulation Defect: home medication list includes an anticoagulant medication         # Severe Obesity: Estimated body mass index is 42.17 kg/m  as calculated from the following:    Height as of this encounter: 1.854 m (6' 1\").    Weight as of this encounter: 145 kg (319 lb 10.7 oz).              Disposition Plan      Expected Discharge Date: 02/22/2024                The patient's care was discussed with the Patient and ED provider, Dr. Garcia .    Lucia Ken PA-C  Hospitalist Service  [facility]  Securely message with [employer] (more info)  Text page via [facility] Paging/Directory     ______________________________________________________________________    Chief Complaint   BRBPR    History is obtained from the patient    History of Present Illness   Rk Benoit is a 33 year old male with PMHx significant for protein C deficiency on Xarelto with hx of multiple DVT/PEs and notes chronic PE and DVT, who presents to the ED with BRBPR. He notes having a BM first thing this morning with blood when he wiped. He then went to work and had 2 episodes of just blood with associated preceding abdominal cramping. ED provider reports that he had another stool in ED that was a little darker. Pt notes melena several years ago and this was different. He notes feeling mildly lightheaded and unwell but otherwise denies fever, chest pain, SOB, nausea or vomiting. Denies changes to stools or excessive straining.   He is on Xarelto for Protein C deficiency. He notes a hx of multiple PE/DVTs with chronic clot in his lungs and his legs. He did not take Xarelto this morning and " notes that his R leg already feels warm.      Past Medical History    Protein C deficiency     Past Surgical History       Prior to Admission Medications   Prior to Admission Medications   Prescriptions Last Dose Informant Patient Reported? Taking?   XARELTO ANTICOAGULANT 20 MG TABS tablet 2/20/2024 at PM  Yes Yes   Sig: TAKE ONE TABLET BY MOUTH ONCE DAILY WITH THE EVENING MEAL   cyclobenzaprine (FLEXERIL) 10 MG tablet Past Month at -  Yes Yes   Sig: TAKE ONE TABLET BY MOUTH EVERY EVENING AT BEDTIME FOR BACK PAIN      Facility-Administered Medications: None           Physical Exam   Vital Signs: Temp: 98.5  F (36.9  C) Temp src: Oral BP: (!) 143/91 Pulse: 92   Resp: 12 SpO2: 99 % O2 Device: None (Room air)    Weight: 319 lbs 10.67 oz    GENERAL:  Comfortable.  PSYCH: pleasant, oriented, No acute distress.  HEENT:  Atraumatic, normocephalic. Normal conjunctiva, normal hearing, and oropharynx is normal.  NECK:  Supple, no neck vein distention  HEART:  Normal S1, S2 with no murmur, no pericardial rub, gallops or S3 or S4.  LUNGS:  Clear to auscultation, normal Respiratory effort. No wheezing, rales or ronchi.  GI:  Soft, normal bowel sounds. Non-tender, non distended.   EXTREMITIES:  No pedal edema, +2 pulses bilateral and equal.  SKIN:  Dry to touch, No rash, wound or ulcerations.  NEUROLOGIC:  CN 2-12 intact, BL 5/5 symmetric upper and lower extremity strength, sensation is intact with no focal deficits.      Medical Decision Making       75+ MINUTES SPENT BY ME on the date of service doing chart review, history, exam, documentation & further activities per the note.      Data     I have personally reviewed the following data over the past 24 hrs:    7.8  \   14.3; 14.3   / 265     142 107 12.9 /  97   4.5 25 0.81 \     ALT: 30 AST: 22 AP: 100 TBILI: 0.3   ALB: 4.7 TOT PROTEIN: 7.9 LIPASE: N/A     INR:  1.39 (H) PTT:  N/A   D-dimer:  N/A Fibrinogen:  N/A       Imaging results reviewed over the past 24 hrs:   Recent  Results (from the past 24 hour(s))   CT Abdomen Pelvis w Contrast    Narrative    CT ABDOMEN AND PELVIS WITH CONTRAST  2/21/2024 1:14 PM    CLINICAL HISTORY: Diffuse abdominal pain, rectal bleeding.    TECHNIQUE: CT scan of the abdomen and pelvis was performed following  injection of IV contrast. Multiplanar reformats were obtained. Dose  reduction techniques were used.  CONTRAST: 100 mL Isovue-[id number]    COMPARISON: None.    FINDINGS:   LOWER CHEST: Normal.    HEPATOBILIARY: Fatty liver. Gallbladder appears unremarkable.    PANCREAS: Normal.    SPLEEN: Normal.    ADRENAL GLANDS: Normal.    KIDNEYS/BLADDER: Normal.    BOWEL: No bowel obstruction, or acute inflammatory change identified.  Please note that CT has limited sensitivity for detection of  underlying bowel masses. There is no perirectal or perianal abscess  identified at this time. Normal appendix.    PELVIC ORGANS: No acute abnormality.    ADDITIONAL FINDINGS: None.    MUSCULOSKELETAL: Normal.      Impression    IMPRESSION:   1.  No acute abnormality identified.  2.  Fatty liver.    RIGO MARTINES MD         SYSTEM ID:  [id number]   Chest XR,  PA & LAT    Narrative    XR CHEST 2 VIEWS  2/21/2024 1:51 PM       INDICATION: chest pain  COMPARISON: None       Impression    IMPRESSION: Negative chest.    MIKO RILEY MD         SYSTEM ID:  [id number]

## 2024-02-22 NOTE — CARE PLAN
PRIMARY DIAGNOSIS: GI BLEED    OUTPATIENT/OBSERVATION GOALS TO BE MET BEFORE DISCHARGE  Orthostatic performed: N/A    Stable Hgb  yes .   Recent Labs   Lab Test 02/21/24  1914 02/21/24  1554 02/21/24  1233   HGB 14.0 14.3  14.3 14.4       Resolved or declined bleeding episodes: Yes Last episode: not yet in the shift    Appropriate testing complete: No    Cleared for discharge by consultants (if involved): Yes    Safe discharge environment identified: Yes    Discharge Planner Nurse   Safe discharge environment identified: Yes  Barriers to discharge: No       Entered by: Bess Moscoso RN 02/22/2024 10:15 AM  Vitals are Temp: 98.2  F (36.8  C) Temp src: Oral BP: 131/81 Pulse: 81   Resp: 16 SpO2: 98 %.    Patient is Aox4, VSS, LCTA, BS active. He denies pain this morning. Still has some blood in his stool. Has a colorectal outpatient consult referral placed. 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 Discharging home today

## 2024-02-23 NOTE — TELEPHONE ENCOUNTER
Diagnosis, Referred by & from: Bleeding Hemorrhoids   Appt date: 4/29/2024   NOTES STATUS DETAILS   OFFICE NOTE from referring provider N/A    OFFICE NOTE from other specialist N/A    DISCHARGE SUMMARY from [facility]:  2/21/24 - Admission with Dr. Ribeiro   DISCHARGE REPORT from the ER N/A    OPERATIVE REPORT N/A    MEDICATION LIST Internal    LABS N/A    DIAGNOSTIC PROCEDURES N/A    IMAGING (DISC & REPORT) N/A

## 2024-04-29 ENCOUNTER — PRE VISIT (OUTPATIENT)
Dept: SURGERY | Facility: CLINIC | Age: 34
End: 2024-04-29

## 2024-05-03 NOTE — TELEPHONE ENCOUNTER
Diagnosis, Referred by & from: Bleeding Hemorrhoids, Protein C Deficiency, lower GI bleed   Appt date: 7/23/2024   NOTES STATUS DETAILS   OFFICE NOTE from referring provider N/A    OFFICE NOTE from other specialist N/A    DISCHARGE SUMMARY from hospital N/A    DISCHARGE REPORT from the ER Internal [facility]:  2/21/24 - Admission with Dr. Ribeiro   OPERATIVE REPORT N/A    MEDICATION LIST Internal    LABS N/A    DIAGNOSTIC PROCEDURES N/A    IMAGING (DISC & REPORT)      CT Internal [facility]:  2/21/24 - CT Abd/Pelvis

## 2024-07-05 ENCOUNTER — APPOINTMENT (OUTPATIENT)
Dept: GENERAL RADIOLOGY | Facility: CLINIC | Age: 34
End: 2024-07-05
Attending: EMERGENCY MEDICINE
Payer: COMMERCIAL

## 2024-07-05 ENCOUNTER — HOSPITAL ENCOUNTER (EMERGENCY)
Facility: CLINIC | Age: 34
Discharge: HOME | End: 2024-07-05
Attending: EMERGENCY MEDICINE | Admitting: EMERGENCY MEDICINE
Payer: COMMERCIAL

## 2024-07-05 VITALS
OXYGEN SATURATION: 98 % | HEIGHT: 73 IN | RESPIRATION RATE: 18 BRPM | HEART RATE: 108 BPM | TEMPERATURE: 97.7 F | DIASTOLIC BLOOD PRESSURE: 104 MMHG | BODY MASS INDEX: 41.75 KG/M2 | WEIGHT: 315 LBS | SYSTOLIC BLOOD PRESSURE: 165 MMHG

## 2024-07-05 DIAGNOSIS — S93.401A SPRAIN OF RIGHT ANKLE, UNSPECIFIED LIGAMENT, INITIAL ENCOUNTER: ICD-10-CM

## 2024-07-05 PROCEDURE — 73610 X-RAY EXAM OF ANKLE: CPT | Mod: RT

## 2024-07-05 PROCEDURE — 99284 EMERGENCY DEPT VISIT MOD MDM: CPT

## 2024-07-05 PROCEDURE — 250N000013 HC RX MED GY IP 250 OP 250 PS 637: Performed by: EMERGENCY MEDICINE

## 2024-07-05 RX ORDER — ACETAMINOPHEN 500 MG
1000 TABLET ORAL ONCE
Status: COMPLETED | OUTPATIENT
Start: 2024-07-05 | End: 2024-07-05

## 2024-07-05 RX ADMIN — ACETAMINOPHEN 1000 MG: 500 TABLET, FILM COATED ORAL at 14:29

## 2024-07-05 ASSESSMENT — COLUMBIA-SUICIDE SEVERITY RATING SCALE - C-SSRS
2. HAVE YOU ACTUALLY HAD ANY THOUGHTS OF KILLING YOURSELF IN THE PAST MONTH?: NO
1. IN THE PAST MONTH, HAVE YOU WISHED YOU WERE DEAD OR WISHED YOU COULD GO TO SLEEP AND NOT WAKE UP?: NO
6. HAVE YOU EVER DONE ANYTHING, STARTED TO DO ANYTHING, OR PREPARED TO DO ANYTHING TO END YOUR LIFE?: NO

## 2024-07-05 ASSESSMENT — ACTIVITIES OF DAILY LIVING (ADL): ADLS_ACUITY_SCORE: 33

## 2024-07-05 NOTE — ED PROVIDER NOTES
"  Emergency Department Note      History of Present Illness     Chief Complaint   Ankle Pain    HPI   Rk Benoit is a 34 year old male with history of DVT and PE anticoagulated on Xarelto who presents for evaluation of a right foot injury. Patient reports that while at work yesterday, he was not looking at where he was walking and stepped out of a garage onto a pile of concrete. He is able to ambulate but has a lot of pain. He has been elevating at home but has not tried ice or pain medications. Patient denies any other injuries, fever, cough, or chest pain. He says that he has fractured this foot in the past.     Independent Historian   None    Review of External Notes   None    Past Medical History     Medical History and Problem List   Protein C deficiency   DVT/PE    Medications   Xarelto     Physical Exam     Patient Vitals for the past 24 hrs:   BP Temp Pulse Resp SpO2 Height Weight   07/05/24 1415 -- -- -- -- 98 % -- --   07/05/24 1400 -- -- 108 -- -- -- --   07/05/24 1156 (!) 165/104 97.7  F (36.5  C) (!) 126 18 96 % 1.854 m (6' 1\") 145.6 kg (320 lb 15.8 oz)     Physical Exam  Vitals and nursing note reviewed.   Constitutional:       General: He is not in acute distress.     Appearance: He is not ill-appearing.   HENT:      Head: Normocephalic and atraumatic.      Right Ear: External ear normal.      Left Ear: External ear normal.      Nose: Nose normal.      Mouth/Throat:      Mouth: Mucous membranes are moist.   Eyes:      Extraocular Movements: Extraocular movements intact.      Conjunctiva/sclera: Conjunctivae normal.   Pulmonary:      Effort: Pulmonary effort is normal. No respiratory distress.   Musculoskeletal:         General: No deformity or signs of injury.      Cervical back: Normal range of motion and neck supple.      Right ankle: Swelling present. No deformity. Tenderness present over the medial malleolus. Normal range of motion. Normal pulse.   Skin:     General: Skin is warm and dry.     "  Findings: No rash.   Neurological:      Mental Status: He is alert and oriented to person, place, and time.   Psychiatric:         Mood and Affect: Mood normal.         Behavior: Behavior normal.           Diagnostics     Lab Results   Labs Ordered and Resulted from Time of ED Arrival to Time of ED Departure - No data to display    Imaging   Ankle XR, G/E 3 views, right   Final Result   IMPRESSION: There are some chronic appearing deformity to the medial   aspect of the medial malleolus with adjacent small chronic appearing   ossicles. Findings are favored to relate to remote injury. No definite   acute right ankle fracture is identified. There is soft tissue   swelling over the ankle. Normal hindfoot joint spacing.      DEMAR ANDERSON MD            SYSTEM ID:  [id number]        EKG   None     Independent Interpretation   X-ray right ankle shows a bony fragment adjacent to the medial malleolus but unclear if this is due to acute fracture    ED Course      Medications Administered   Medications   acetaminophen (TYLENOL) tablet 1,000 mg (1,000 mg Oral $Given 7/5/24 2319)     Procedures   None      Discussion of Management   None    ED Course   ED Course as of 07/05/24 2008 Fri Jul 05, 2024   1410 I evaluated the patient, obtained history, and performed a physical exam as detailed above. We discussed plans for discharge.     Optional/Additional Documentation  None    Medical Decision Making / Diagnosis     CMS Diagnoses: None    MIPS   None    [facility]   Rk Benoit is a 34 year old male who presents with a right ankle injury.  He is tender around the medial malleolus with some swelling.  This may be a sprain, however the x-ray does show slight deformity in the area of the medial malleolus with a bony fragment or ossicle.  Radiology favors that this is an old injury but this is where the patient is tender so I will place him in a walking boot and give crutches.  Recommend orthopedic follow-up.  Patient was noted  to be quite tachycardic and hypertensive on arrival but when I reevaluated him his heart rate was in the 80s.  He denied any other symptoms or concerns.  We discussed return precautions.    Disposition   The patient was discharged.     Diagnosis     ICD-10-CM    1. Sprain of right ankle, unspecified ligament, initial encounter  S93.401A          Discharge Medications   Discharge Medication List as of 7/5/2024  2:21 PM        Scribe Disclosure:  I, Michelle Phillips, am serving as a scribe at 2:07 PM on 7/5/2024 to document services personally performed by Kenny Hernandez MD based on my observations and the provider's statements to me.        Kenny Hernandez MD  07/05/24 2011

## 2024-07-23 ENCOUNTER — PRE VISIT (OUTPATIENT)
Dept: SURGERY | Facility: CLINIC | Age: 34
End: 2024-07-23